# Patient Record
Sex: FEMALE | Race: OTHER | HISPANIC OR LATINO | URBAN - METROPOLITAN AREA
[De-identification: names, ages, dates, MRNs, and addresses within clinical notes are randomized per-mention and may not be internally consistent; named-entity substitution may affect disease eponyms.]

---

## 2022-12-02 ENCOUNTER — EMERGENCY (EMERGENCY)
Facility: HOSPITAL | Age: 27
LOS: 1 days | Discharge: HOME | End: 2022-12-02
Attending: EMERGENCY MEDICINE
Payer: MEDICAID

## 2022-12-02 VITALS
RESPIRATION RATE: 18 BRPM | DIASTOLIC BLOOD PRESSURE: 68 MMHG | OXYGEN SATURATION: 98 % | SYSTOLIC BLOOD PRESSURE: 120 MMHG | HEART RATE: 88 BPM | TEMPERATURE: 98 F

## 2022-12-02 VITALS
SYSTOLIC BLOOD PRESSURE: 117 MMHG | WEIGHT: 145.06 LBS | RESPIRATION RATE: 18 BRPM | DIASTOLIC BLOOD PRESSURE: 72 MMHG | OXYGEN SATURATION: 99 % | HEART RATE: 96 BPM | TEMPERATURE: 98 F | HEIGHT: 61 IN

## 2022-12-02 LAB
ALBUMIN SERPL ELPH-MCNC: 4 G/DL — SIGNIFICANT CHANGE UP (ref 3.3–5.2)
ALP SERPL-CCNC: 93 U/L — SIGNIFICANT CHANGE UP (ref 40–120)
ALT FLD-CCNC: 44 U/L — HIGH
ANION GAP SERPL CALC-SCNC: 11 MMOL/L — SIGNIFICANT CHANGE UP (ref 5–17)
APPEARANCE UR: ABNORMAL
APTT BLD: 29.8 SEC — SIGNIFICANT CHANGE UP (ref 27.5–35.5)
AST SERPL-CCNC: 25 U/L — SIGNIFICANT CHANGE UP
BACTERIA # UR AUTO: ABNORMAL
BASOPHILS # BLD AUTO: 0.03 K/UL — SIGNIFICANT CHANGE UP (ref 0–0.2)
BASOPHILS NFR BLD AUTO: 0.2 % — SIGNIFICANT CHANGE UP (ref 0–2)
BILIRUB SERPL-MCNC: <0.2 MG/DL — LOW (ref 0.4–2)
BILIRUB UR-MCNC: NEGATIVE — SIGNIFICANT CHANGE UP
BLD GP AB SCN SERPL QL: SIGNIFICANT CHANGE UP
BUN SERPL-MCNC: 8.4 MG/DL — SIGNIFICANT CHANGE UP (ref 8–20)
CALCIUM SERPL-MCNC: 9.9 MG/DL — SIGNIFICANT CHANGE UP (ref 8.4–10.5)
CHLORIDE SERPL-SCNC: 100 MMOL/L — SIGNIFICANT CHANGE UP (ref 96–108)
CO2 SERPL-SCNC: 24 MMOL/L — SIGNIFICANT CHANGE UP (ref 22–29)
COLOR SPEC: YELLOW — SIGNIFICANT CHANGE UP
CREAT SERPL-MCNC: 0.4 MG/DL — LOW (ref 0.5–1.3)
DIFF PNL FLD: ABNORMAL
EGFR: 140 ML/MIN/1.73M2 — SIGNIFICANT CHANGE UP
EOSINOPHIL # BLD AUTO: 0.14 K/UL — SIGNIFICANT CHANGE UP (ref 0–0.5)
EOSINOPHIL NFR BLD AUTO: 1.2 % — SIGNIFICANT CHANGE UP (ref 0–6)
EPI CELLS # UR: SIGNIFICANT CHANGE UP
GLUCOSE SERPL-MCNC: 77 MG/DL — SIGNIFICANT CHANGE UP (ref 70–99)
GLUCOSE UR QL: NEGATIVE MG/DL — SIGNIFICANT CHANGE UP
HCG SERPL-ACNC: HIGH MIU/ML
HCT VFR BLD CALC: 42.2 % — SIGNIFICANT CHANGE UP (ref 34.5–45)
HGB BLD-MCNC: 14 G/DL — SIGNIFICANT CHANGE UP (ref 11.5–15.5)
HIV 1 & 2 AB SERPL IA.RAPID: SIGNIFICANT CHANGE UP
IMM GRANULOCYTES NFR BLD AUTO: 0.5 % — SIGNIFICANT CHANGE UP (ref 0–0.9)
INR BLD: 1.05 RATIO — SIGNIFICANT CHANGE UP (ref 0.88–1.16)
KETONES UR-MCNC: NEGATIVE — SIGNIFICANT CHANGE UP
LEUKOCYTE ESTERASE UR-ACNC: ABNORMAL
LYMPHOCYTES # BLD AUTO: 2.81 K/UL — SIGNIFICANT CHANGE UP (ref 1–3.3)
LYMPHOCYTES # BLD AUTO: 23.4 % — SIGNIFICANT CHANGE UP (ref 13–44)
MCHC RBC-ENTMCNC: 28.6 PG — SIGNIFICANT CHANGE UP (ref 27–34)
MCHC RBC-ENTMCNC: 33.2 GM/DL — SIGNIFICANT CHANGE UP (ref 32–36)
MCV RBC AUTO: 86.1 FL — SIGNIFICANT CHANGE UP (ref 80–100)
MONOCYTES # BLD AUTO: 0.91 K/UL — HIGH (ref 0–0.9)
MONOCYTES NFR BLD AUTO: 7.6 % — SIGNIFICANT CHANGE UP (ref 2–14)
NEUTROPHILS # BLD AUTO: 8.06 K/UL — HIGH (ref 1.8–7.4)
NEUTROPHILS NFR BLD AUTO: 67.1 % — SIGNIFICANT CHANGE UP (ref 43–77)
NITRITE UR-MCNC: NEGATIVE — SIGNIFICANT CHANGE UP
PH UR: 6 — SIGNIFICANT CHANGE UP (ref 5–8)
PLATELET # BLD AUTO: 440 K/UL — HIGH (ref 150–400)
POTASSIUM SERPL-MCNC: 4.1 MMOL/L — SIGNIFICANT CHANGE UP (ref 3.5–5.3)
POTASSIUM SERPL-SCNC: 4.1 MMOL/L — SIGNIFICANT CHANGE UP (ref 3.5–5.3)
PROT SERPL-MCNC: 7.7 G/DL — SIGNIFICANT CHANGE UP (ref 6.6–8.7)
PROT UR-MCNC: NEGATIVE — SIGNIFICANT CHANGE UP
PROTHROM AB SERPL-ACNC: 12.2 SEC — SIGNIFICANT CHANGE UP (ref 10.5–13.4)
RBC # BLD: 4.9 M/UL — SIGNIFICANT CHANGE UP (ref 3.8–5.2)
RBC # FLD: 13 % — SIGNIFICANT CHANGE UP (ref 10.3–14.5)
RBC CASTS # UR COMP ASSIST: ABNORMAL /HPF (ref 0–4)
SODIUM SERPL-SCNC: 135 MMOL/L — SIGNIFICANT CHANGE UP (ref 135–145)
SP GR SPEC: 1.02 — SIGNIFICANT CHANGE UP (ref 1.01–1.02)
UROBILINOGEN FLD QL: NEGATIVE MG/DL — SIGNIFICANT CHANGE UP
WBC # BLD: 12.01 K/UL — HIGH (ref 3.8–10.5)
WBC # FLD AUTO: 12.01 K/UL — HIGH (ref 3.8–10.5)
WBC UR QL: ABNORMAL /HPF (ref 0–5)

## 2022-12-02 PROCEDURE — 85025 COMPLETE CBC W/AUTO DIFF WBC: CPT

## 2022-12-02 PROCEDURE — 80053 COMPREHEN METABOLIC PANEL: CPT

## 2022-12-02 PROCEDURE — 85610 PROTHROMBIN TIME: CPT

## 2022-12-02 PROCEDURE — 76817 TRANSVAGINAL US OBSTETRIC: CPT

## 2022-12-02 PROCEDURE — 36415 COLL VENOUS BLD VENIPUNCTURE: CPT

## 2022-12-02 PROCEDURE — 99284 EMERGENCY DEPT VISIT MOD MDM: CPT | Mod: 25

## 2022-12-02 PROCEDURE — 76801 OB US < 14 WKS SINGLE FETUS: CPT | Mod: 26

## 2022-12-02 PROCEDURE — 81001 URINALYSIS AUTO W/SCOPE: CPT

## 2022-12-02 PROCEDURE — 87086 URINE CULTURE/COLONY COUNT: CPT

## 2022-12-02 PROCEDURE — 86850 RBC ANTIBODY SCREEN: CPT

## 2022-12-02 PROCEDURE — 86900 BLOOD TYPING SEROLOGIC ABO: CPT

## 2022-12-02 PROCEDURE — 86703 HIV-1/HIV-2 1 RESULT ANTBDY: CPT

## 2022-12-02 PROCEDURE — 76801 OB US < 14 WKS SINGLE FETUS: CPT

## 2022-12-02 PROCEDURE — 99285 EMERGENCY DEPT VISIT HI MDM: CPT

## 2022-12-02 PROCEDURE — 76817 TRANSVAGINAL US OBSTETRIC: CPT | Mod: 26

## 2022-12-02 PROCEDURE — 86901 BLOOD TYPING SEROLOGIC RH(D): CPT

## 2022-12-02 PROCEDURE — 84702 CHORIONIC GONADOTROPIN TEST: CPT

## 2022-12-02 PROCEDURE — 85730 THROMBOPLASTIN TIME PARTIAL: CPT

## 2022-12-02 RX ORDER — CEPHALEXIN 500 MG
1 CAPSULE ORAL
Qty: 28 | Refills: 0
Start: 2022-12-02 | End: 2022-12-08

## 2022-12-02 RX ORDER — CEPHALEXIN 500 MG
500 CAPSULE ORAL ONCE
Refills: 0 | Status: COMPLETED | OUTPATIENT
Start: 2022-12-02 | End: 2022-12-02

## 2022-12-02 RX ADMIN — Medication 500 MILLIGRAM(S): at 16:49

## 2022-12-02 NOTE — ED PROVIDER NOTE - OBJECTIVE STATEMENT
28 y/o female with PMHx presents to ED c/o suprapubic pain yesterday with associated vaginal bleeding. LMP-September 7th. Pt states she is 11 weeks pregnant, did not have blood test or US to confirm pregnancy, only urine. Pt is having some abdominal cramping as well. No other complaints. Pt is a non-smoker and denies etoh use.

## 2022-12-02 NOTE — ED PROVIDER NOTE - NS ED ROS FT
Pt denies fevers/chills, ha, loc, focal neuro deficits, cp/sob/palp, cough, n/v/d, urinary symptoms, recent travel and sick contacts. + abdominal pain +vaginal bleeding all others negative except as per hpi

## 2022-12-02 NOTE — ED PROVIDER NOTE - PATIENT PORTAL LINK FT
You can access the FollowMyHealth Patient Portal offered by [facility] by registering at the following website: [email or web address]. By joining [employer]’s FollowMyHealth portal, you will also be able to view your health information using other applications (apps) compatible with our system.

## 2022-12-02 NOTE — ED PROVIDER NOTE - CLINICAL SUMMARY MEDICAL DECISION MAKING FREE TEXT BOX
26 y/o female with PMHx presents to ED c/o suprapubic pain yesterday with associated vaginal bleeding. Pt with no documented IUP with vaginal bleeding and cramping, Obtain US to r/o ectopic vs AB vs threatened AB.

## 2022-12-02 NOTE — ED PROVIDER NOTE - PROGRESS NOTE DETAILS
All results discussed with the patient, and a copy of results has been provided. Patient is comfortable with dc plan for home. Opportunity for questions was provided and all questions have been addressed. Patient understands when to return to ED if symptoms worsen. Gina santana

## 2022-12-04 LAB
CULTURE RESULTS: SIGNIFICANT CHANGE UP
SPECIMEN SOURCE: SIGNIFICANT CHANGE UP

## 2022-12-20 PROBLEM — Z78.9 OTHER SPECIFIED HEALTH STATUS: Chronic | Status: ACTIVE | Noted: 2022-12-11

## 2022-12-22 ENCOUNTER — ASOB RESULT (OUTPATIENT)
Age: 27
End: 2022-12-22

## 2022-12-22 ENCOUNTER — APPOINTMENT (OUTPATIENT)
Dept: ANTEPARTUM | Facility: CLINIC | Age: 27
End: 2022-12-22

## 2022-12-22 PROBLEM — Z00.00 ENCOUNTER FOR PREVENTIVE HEALTH EXAMINATION: Status: ACTIVE | Noted: 2022-12-22

## 2022-12-22 PROCEDURE — 76815 OB US LIMITED FETUS(S): CPT

## 2023-02-02 ENCOUNTER — APPOINTMENT (OUTPATIENT)
Dept: ANTEPARTUM | Facility: CLINIC | Age: 28
End: 2023-02-02
Payer: MEDICAID

## 2023-02-02 ENCOUNTER — ASOB RESULT (OUTPATIENT)
Age: 28
End: 2023-02-02

## 2023-02-02 PROCEDURE — 76817 TRANSVAGINAL US OBSTETRIC: CPT

## 2023-02-02 PROCEDURE — 76805 OB US >/= 14 WKS SNGL FETUS: CPT

## 2023-04-20 ENCOUNTER — ASOB RESULT (OUTPATIENT)
Age: 28
End: 2023-04-20

## 2023-04-20 ENCOUNTER — APPOINTMENT (OUTPATIENT)
Dept: ANTEPARTUM | Facility: CLINIC | Age: 28
End: 2023-04-20
Payer: MEDICAID

## 2023-04-20 PROCEDURE — 76816 OB US FOLLOW-UP PER FETUS: CPT

## 2023-04-20 PROCEDURE — 76819 FETAL BIOPHYS PROFIL W/O NST: CPT

## 2023-05-25 ENCOUNTER — ASOB RESULT (OUTPATIENT)
Age: 28
End: 2023-05-25

## 2023-05-25 ENCOUNTER — APPOINTMENT (OUTPATIENT)
Dept: ANTEPARTUM | Facility: CLINIC | Age: 28
End: 2023-05-25
Payer: MEDICAID

## 2023-05-25 PROCEDURE — 76819 FETAL BIOPHYS PROFIL W/O NST: CPT

## 2023-05-25 PROCEDURE — 76816 OB US FOLLOW-UP PER FETUS: CPT

## 2023-05-26 ENCOUNTER — INPATIENT (INPATIENT)
Facility: HOSPITAL | Age: 28
LOS: 0 days | Discharge: HOME | DRG: 832 | End: 2023-05-26
Attending: OBSTETRICS & GYNECOLOGY | Admitting: OBSTETRICS & GYNECOLOGY
Payer: COMMERCIAL

## 2023-05-26 VITALS
TEMPERATURE: 98 F | RESPIRATION RATE: 18 BRPM | SYSTOLIC BLOOD PRESSURE: 107 MMHG | WEIGHT: 147.05 LBS | DIASTOLIC BLOOD PRESSURE: 71 MMHG | HEIGHT: 59.06 IN | HEART RATE: 108 BPM

## 2023-05-26 VITALS — HEART RATE: 92 BPM | OXYGEN SATURATION: 92 %

## 2023-05-26 DIAGNOSIS — O47.1 FALSE LABOR AT OR AFTER 37 COMPLETED WEEKS OF GESTATION: ICD-10-CM

## 2023-05-26 DIAGNOSIS — Z98.890 OTHER SPECIFIED POSTPROCEDURAL STATES: Chronic | ICD-10-CM

## 2023-05-26 DIAGNOSIS — Z3A.37 37 WEEKS GESTATION OF PREGNANCY: ICD-10-CM

## 2023-05-26 PROCEDURE — 59412 ANTEPARTUM MANIPULATION: CPT

## 2023-05-26 RX ORDER — SODIUM CHLORIDE 9 MG/ML
1000 INJECTION, SOLUTION INTRAVENOUS ONCE
Refills: 0 | Status: COMPLETED | OUTPATIENT
Start: 2023-05-26 | End: 2023-05-26

## 2023-05-26 RX ORDER — SODIUM CHLORIDE 9 MG/ML
500 INJECTION, SOLUTION INTRAVENOUS ONCE
Refills: 0 | Status: DISCONTINUED | OUTPATIENT
Start: 2023-05-26 | End: 2023-05-26

## 2023-05-26 RX ADMIN — SODIUM CHLORIDE 1000 MILLILITER(S): 9 INJECTION, SOLUTION INTRAVENOUS at 15:00

## 2023-05-26 NOTE — OB RN PATIENT PROFILE - NSSTATEDREASONFORADM_OBGYN_A_OB_FT
The baby still hasn't settled down. So the doctors will try to reposition her My baby is in the wrong position, I am here for it to be flipped

## 2023-05-26 NOTE — OB PROVIDER H&P - HISTORY OF PRESENT ILLNESS
Patient is a 28 y.o.  at 37 weeks 2 days gestation who presents to L&D for an external cephalic version.   +FM, - LOF, - VB. She reports irregular contractions.     This pregnancy is complicated by:  - breech presentation   - BV infection s/p treatment    Gynhx: denies abnormal paps, STDs, ovarian cysts, fibroids.   PMH: parkerie  PSH: nose surgery  Meds: PNV, flagyl, ASA  SocialHx: denies alcohol, tobacco, and drug use. Feels safe at home.   All: NKDA

## 2023-05-26 NOTE — DISCHARGE NOTE ANTEPARTUM - PATIENT PORTAL LINK FT
You can access the FollowMyHealth Patient Portal offered by [facility] by registering at the following website: [email or web address]. By joining [employer]’s FollowMyHealth portal, you will also be able to view your health information using other applications (apps) compatible with our system.

## 2023-05-26 NOTE — DISCHARGE NOTE ANTEPARTUM - MEDICATION SUMMARY - MEDICATIONS TO TAKE
I will START or STAY ON the medications listed below when I get home from the hospital:    cephalexin 500 mg oral capsule  -- 1 cap(s) by mouth 4 times a day   -- Finish all this medication unless otherwise directed by prescriber.    -- Indication: For home med

## 2023-05-26 NOTE — DISCHARGE NOTE ANTEPARTUM - CARE PROVIDER_API CALL
Kaylyn Tenorio  Obstetrics and Gynecology  [facility] [place] [address]  Phone: [phone]  Fax: [phone]  Established Patient  Follow Up Time:

## 2023-05-26 NOTE — DISCHARGE NOTE ANTEPARTUM - CARE PLAN
Principal Discharge DX:	Fetus or  affected by external version before labor  Assessment and plan of treatment:	Patient was found in breech presentation and underwent a successfully external cephalic version. Patient is to present to the hospital when she starts to feel strong contractions, as well as if she feels no fetal movement, has leakage of fluid, and vaginal bleeding.   1

## 2023-05-26 NOTE — DISCHARGE NOTE ANTEPARTUM - NS MD DC FALL RISK RISK
For information on Fall & Injury Prevention, visit: [email or web address] OR  [email or web address] OR  https://www.cdc.gov/steadi/patient.html

## 2023-05-26 NOTE — OB PROVIDER H&P - ATTENDING COMMENTS
MFM Attending    Patient is a 28 year old  at 37 2/7 week admitted with a fetus in breech presentation. She was counseled for and elected to have an external cephalic version procedure. FHR was category 1 before and after procedure. See procedure note in [facility]. To be discharged home.    RAFFI Andrade

## 2023-05-26 NOTE — DISCHARGE NOTE ANTEPARTUM - HOSPITAL COURSE
Patient is a 28 y.o.  at 37 weeks 2 days gestation who presented to the hospital and underwent a successful external cephalic version, uncomplicated. Pre and post procedural NSTs reactive. Patient was sent home in stable condition.

## 2023-05-26 NOTE — OB RN PATIENT PROFILE - BRADEN SCORE (IF 18 OR LESS ACTIVATE SKIN INJURY RISK INCREASED GUIDELINE), MLM
Patient is in the supine position.   The body was positioned using the following devices: safety strap, gel pad mattress, self-adhering foam and blanket.  The left arm was positioned using the following devices: safety strap, arm board, gel arm pads, self-adhering foam and blanket.  The right arm was positioned using the following devices: safety strap, arm board, gel arm pads, self-adhering foam and blanket.  The patient was positioned by Kole Hernandez, Addis Haro, Jenna Mercado RN  23

## 2023-05-26 NOTE — OB PROVIDER H&P - NSHPPHYSICALEXAM_GEN_ALL_CORE
Vital Signs Last 24 Hrs  HR: 96 (26 May 2023 15:43) (89 - 111)  BP: 107/71 (26 May 2023 14:46) (107/71 - 107/71)  RR: 18 (26 May 2023 14:36) (18 - 18)  SpO2: 99% (26 May 2023 15:43) (91% - 100%)    Parameters below as of 26 May 2023 14:36  Patient On (Oxygen Delivery Method): room air    gen: NAD  Abd: gravid, soft, non-tender  Bedside US: head at maternal right, breech, anterior placenta  FHT: 135 bpm, moderate variability, - accels, - decels  Evarts: irregular

## 2023-05-26 NOTE — DISCHARGE NOTE ANTEPARTUM - PLAN OF CARE
Patient was found in breech presentation and underwent a successfully external cephalic version. Patient is to present to the hospital when she starts to feel strong contractions, as well as if she feels no fetal movement, has leakage of fluid, and vaginal bleeding.

## 2023-05-26 NOTE — OB PROVIDER H&P - ASSESSMENT
Patient is a 28 y.o.  at 37 weeks 2 days gestation admitted to L&D for ECV. Reactive NST.    - consent  - admission labs  - IV hydration  - continuous toco and fetal heart monitoring  - terbutaline prn  - patient blood type O+      Discussed with Dr. Andrade

## 2023-06-16 ENCOUNTER — APPOINTMENT (OUTPATIENT)
Dept: ANTEPARTUM | Facility: CLINIC | Age: 28
End: 2023-06-16
Payer: MEDICAID

## 2023-06-16 ENCOUNTER — ASOB RESULT (OUTPATIENT)
Age: 28
End: 2023-06-16

## 2023-06-16 PROCEDURE — 76818 FETAL BIOPHYS PROFILE W/NST: CPT

## 2023-06-18 ENCOUNTER — INPATIENT (INPATIENT)
Facility: HOSPITAL | Age: 28
LOS: 3 days | Discharge: HOME | End: 2023-06-22
Attending: OBSTETRICS & GYNECOLOGY | Admitting: OBSTETRICS & GYNECOLOGY
Payer: COMMERCIAL

## 2023-06-18 VITALS
HEART RATE: 95 BPM | TEMPERATURE: 98 F | RESPIRATION RATE: 17 BRPM | SYSTOLIC BLOOD PRESSURE: 130 MMHG | DIASTOLIC BLOOD PRESSURE: 81 MMHG

## 2023-06-18 DIAGNOSIS — O26.893 OTHER SPECIFIED PREGNANCY RELATED CONDITIONS, THIRD TRIMESTER: ICD-10-CM

## 2023-06-18 DIAGNOSIS — Z98.890 OTHER SPECIFIED POSTPROCEDURAL STATES: Chronic | ICD-10-CM

## 2023-06-18 LAB
BASOPHILS # BLD AUTO: 0.03 K/UL — SIGNIFICANT CHANGE UP (ref 0–0.2)
BASOPHILS NFR BLD AUTO: 0.3 % — SIGNIFICANT CHANGE UP (ref 0–2)
BLD GP AB SCN SERPL QL: SIGNIFICANT CHANGE UP
EOSINOPHIL # BLD AUTO: 0.16 K/UL — SIGNIFICANT CHANGE UP (ref 0–0.5)
EOSINOPHIL NFR BLD AUTO: 1.8 % — SIGNIFICANT CHANGE UP (ref 0–6)
HCT VFR BLD CALC: 37 % — SIGNIFICANT CHANGE UP (ref 34.5–45)
HGB BLD-MCNC: 12.2 G/DL — SIGNIFICANT CHANGE UP (ref 11.5–15.5)
IMM GRANULOCYTES NFR BLD AUTO: 0.5 % — SIGNIFICANT CHANGE UP (ref 0–0.9)
LYMPHOCYTES # BLD AUTO: 2.23 K/UL — SIGNIFICANT CHANGE UP (ref 1–3.3)
LYMPHOCYTES # BLD AUTO: 25.8 % — SIGNIFICANT CHANGE UP (ref 13–44)
MCHC RBC-ENTMCNC: 25.7 PG — LOW (ref 27–34)
MCHC RBC-ENTMCNC: 33 GM/DL — SIGNIFICANT CHANGE UP (ref 32–36)
MCV RBC AUTO: 78.1 FL — LOW (ref 80–100)
MONOCYTES # BLD AUTO: 0.75 K/UL — SIGNIFICANT CHANGE UP (ref 0–0.9)
MONOCYTES NFR BLD AUTO: 8.7 % — SIGNIFICANT CHANGE UP (ref 2–14)
NEUTROPHILS # BLD AUTO: 5.45 K/UL — SIGNIFICANT CHANGE UP (ref 1.8–7.4)
NEUTROPHILS NFR BLD AUTO: 62.9 % — SIGNIFICANT CHANGE UP (ref 43–77)
PLATELET # BLD AUTO: 368 K/UL — SIGNIFICANT CHANGE UP (ref 150–400)
RAPID RVP RESULT: DETECTED
RBC # BLD: 4.74 M/UL — SIGNIFICANT CHANGE UP (ref 3.8–5.2)
RBC # FLD: 14.3 % — SIGNIFICANT CHANGE UP (ref 10.3–14.5)
RV+EV RNA SPEC QL NAA+PROBE: DETECTED
SARS-COV-2 RNA SPEC QL NAA+PROBE: SIGNIFICANT CHANGE UP
WBC # BLD: 8.66 K/UL — SIGNIFICANT CHANGE UP (ref 3.8–10.5)
WBC # FLD AUTO: 8.66 K/UL — SIGNIFICANT CHANGE UP (ref 3.8–10.5)

## 2023-06-18 RX ORDER — SODIUM CHLORIDE 9 MG/ML
1000 INJECTION, SOLUTION INTRAVENOUS
Refills: 0 | Status: DISCONTINUED | OUTPATIENT
Start: 2023-06-18 | End: 2023-06-20

## 2023-06-18 RX ORDER — CHLORHEXIDINE GLUCONATE 213 G/1000ML
1 SOLUTION TOPICAL DAILY
Refills: 0 | Status: DISCONTINUED | OUTPATIENT
Start: 2023-06-18 | End: 2023-06-20

## 2023-06-18 RX ORDER — CITRIC ACID/SODIUM CITRATE 300-500 MG
30 SOLUTION, ORAL ORAL ONCE
Refills: 0 | Status: DISCONTINUED | OUTPATIENT
Start: 2023-06-18 | End: 2023-06-20

## 2023-06-18 RX ORDER — OXYTOCIN 10 UNIT/ML
2 VIAL (ML) INJECTION
Qty: 30 | Refills: 0 | Status: DISCONTINUED | OUTPATIENT
Start: 2023-06-18 | End: 2023-06-22

## 2023-06-18 RX ORDER — OXYTOCIN 10 UNIT/ML
333.33 VIAL (ML) INJECTION
Qty: 20 | Refills: 0 | Status: COMPLETED | OUTPATIENT
Start: 2023-06-18 | End: 2023-06-18

## 2023-06-18 RX ORDER — ACETAMINOPHEN 500 MG
975 TABLET ORAL ONCE
Refills: 0 | Status: COMPLETED | OUTPATIENT
Start: 2023-06-18 | End: 2023-06-18

## 2023-06-18 RX ADMIN — SODIUM CHLORIDE 125 MILLILITER(S): 9 INJECTION, SOLUTION INTRAVENOUS at 08:45

## 2023-06-18 RX ADMIN — Medication 975 MILLIGRAM(S): at 17:46

## 2023-06-18 RX ADMIN — Medication 975 MILLIGRAM(S): at 18:30

## 2023-06-18 NOTE — OB RN DELIVERY SUMMARY - NS_SEPSISRSKCALC_OBGYN_ALL_OB_FT
EOS calculated successfully. EOS Risk Factor: 0.5/1000 live births ([facility] national incidence); GA=40w6d; Temp=98.96; ROM=24.667; GBS='Negative'; Antibiotics='No antibiotics or any antibiotics < 2 hrs prior to birth'

## 2023-06-18 NOTE — OB PROVIDER H&P - NSHPPHYSICALEXAM_GEN_ALL_CORE
Vitals:   T(C): 36.7 (06-18-23 @ 09:03), Max: 36.7 (06-18-23 @ 08:43)  T(F): 98.1 (06-18-23 @ 09:03), Max: 98.1 (06-18-23 @ 09:03)  HR: 95 (06-18-23 @ 09:03) (95 - 95)  BP: 130/81 (06-18-23 @ 09:03) (130/81 - 130/81)  RR: 17 (06-18-23 @ 09:03) (17 - 17)    General: AAOx3, NAD  Abd: Soft, nontender, gravid  SVE: 0/0/-3    BMI (kg/m2): 29.3 (06-18-23), 29.6 (05-26-23)    FHT: reactive  [place]:  irregular    Ultrasound: (6/16 MF sono pending) breech anterior on 5/26 prior to ECV   EFW: 3285g, 72%ile on 5/26

## 2023-06-18 NOTE — OB RN DELIVERY SUMMARY - NSSELHIDDEN_OBGYN_ALL_OB_FT
[NS_DeliveryAttending1_OBGYN_ALL_OB_FT:[email or web address]],[NS_DeliveryAssist1_OBGYN_ALL_OB_FT:[id number]],[NS_DeliveryRN_OBGYN_ALL_OB_FT:[id number]=]

## 2023-06-18 NOTE — OB RN DELIVERY SUMMARY - NS_LABORCHARACTER_OBGYN_ALL_OB
Induction of labor-Medicinal/External electronic FM Induction of labor-Medicinal/Augmentation of labor/External electronic FM

## 2023-06-18 NOTE — OB RN DELIVERY SUMMARY - NS_BIRTHTRAUMADETAILSA_OBGYN_ALL_OB_FT
Upon  assessment by SAJI Cobos, Neonatologist MD Terrell called to bedside to assess newborns left arm. X-ray ordered. Pt's family educated on upcoming x-ray and plan of care.

## 2023-06-18 NOTE — OB PROVIDER H&P - NSLOWPPHRISK_OBGYN_A_OB
No previous uterine incision/Cook Pregnancy/Less than or equal to 4 previous vaginal births/No known bleeding disorder/No history of postpartum hemorrhage/No other PPH risks indicated

## 2023-06-18 NOTE — OB PROVIDER H&P - ASSESSMENT
A/P: SONALI FISHER is a 29yo  at 40w4d by LMP 22 ZHEN 23 who presents for term IOL.     Admission labs  Consent  GBS neg, no ppx  Cephalic, intact, 0/0/-3  Johnson irregularly  Plan for buccal cytotec   Analgesia PRN    D/w Dr. Rice

## 2023-06-18 NOTE — OB RN PATIENT PROFILE - NSNAMEOFMDOFFICE_OBGYN_ALL_OB_FT
Additional Notes: Left Mid Upper Back, Excision\\nWound/Scar of Prior Procedure, No Residual Squamous Cell Carcinoma is Identified\\nR22.9 Detail Level: Detailed SR[facility]

## 2023-06-18 NOTE — OB PROVIDER H&P - HISTORY OF PRESENT ILLNESS
SONALI FISHER is a 29yo G at  (dated by c/w x trimester sono) presenting for     ROS:  Denies leakage of fluids, vaginal bleeding, painful contractions. Reports active fetal movement. ***  Denies fever, chills, nausea, vomiting. ***  Denies HA, RUQ pain, vision changes, increased leg swelling. ***    PNC @    OBhx: denies  Gyn: denies  PMH: denies  PSH: denies  Med: PNV  Allergies: NKDA    Vitals: ***  T(C): 36.7 (06-18-23 @ 09:03), Max: 36.7 (06-18-23 @ 08:43)  T(F): 98.1 (06-18-23 @ 09:03), Max: 98.1 (06-18-23 @ 09:03)  HR: 95 (06-18-23 @ 09:03) (95 - 95)  BP: 130/81 (06-18-23 @ 09:03) (130/81 - 130/81)  RR: 17 (06-18-23 @ 09:03) (17 - 17)  SpO2: --    General: AAOx3, NAD  Heart: RRR  Lungs: CTAB  Abd: Soft, nontender, gravid  SVE: ***    BMI: ***  BMI (kg/m2): 29.3 (06-18-23), 29.6 (05-26-23)      FHT: ***  Reservoir:  ***    MFM sono:   Bedside sono: ***      A/P:     - Admit to L&D for eIOL:        Admission labs        Consent        Induction method: ***  - Admit to L&D for CS:        Pre op labs        Consent        Antibiotics  - Labs:         GBS         Rub I/I        HIV        Syphilis        Blood type        Hb  - Maternal and fetal status reassuring, will continue to monitor ***        VSS        Cephalic presentation        Cat 1 tracing        Not jaz  - Analgesia: ***    D/w ***     SONALI FISHER is a 27yo  at 40w4d by LMP 22 ZHEN 23 who presents for term IOL. Reports irregular contractions and active FM. Denies VB and LOF.    PNC @ [facility]:  - Breech presentation s/p successful ECV      OBhx: denies  Gyn: denies  PMH: denies  PSH: rhinoplasty  Med: PNV, ASA  Allergies: NKDA

## 2023-06-19 LAB — T PALLIDUM AB TITR SER: NEGATIVE — SIGNIFICANT CHANGE UP

## 2023-06-19 RX ADMIN — CHLORHEXIDINE GLUCONATE 1 APPLICATION(S): 213 SOLUTION TOPICAL at 16:18

## 2023-06-19 RX ADMIN — Medication 2 MILLIUNIT(S)/MIN: at 08:37

## 2023-06-19 RX ADMIN — Medication 2 MILLIUNIT(S)/MIN: at 00:22

## 2023-06-19 RX ADMIN — SODIUM CHLORIDE 125 MILLILITER(S): 9 INJECTION, SOLUTION INTRAVENOUS at 08:38

## 2023-06-19 RX ADMIN — SODIUM CHLORIDE 125 MILLILITER(S): 9 INJECTION, SOLUTION INTRAVENOUS at 16:18

## 2023-06-20 ENCOUNTER — APPOINTMENT (OUTPATIENT)
Dept: ANTEPARTUM | Facility: CLINIC | Age: 28
End: 2023-06-20

## 2023-06-20 ENCOUNTER — TRANSCRIPTION ENCOUNTER (OUTPATIENT)
Age: 28
End: 2023-06-20

## 2023-06-20 LAB
HCT VFR BLD CALC: 30.7 % — LOW (ref 34.5–45)
HGB BLD-MCNC: 9.7 G/DL — LOW (ref 11.5–15.5)

## 2023-06-20 RX ORDER — ACETAMINOPHEN 500 MG
975 TABLET ORAL
Refills: 0 | Status: DISCONTINUED | OUTPATIENT
Start: 2023-06-20 | End: 2023-06-22

## 2023-06-20 RX ORDER — IBUPROFEN 200 MG
600 TABLET ORAL EVERY 6 HOURS
Refills: 0 | Status: COMPLETED | OUTPATIENT
Start: 2023-06-20 | End: 2024-05-18

## 2023-06-20 RX ORDER — TETANUS TOXOID, REDUCED DIPHTHERIA TOXOID AND ACELLULAR PERTUSSIS VACCINE, ADSORBED 5; 2.5; 8; 8; 2.5 [IU]/.5ML; [IU]/.5ML; UG/.5ML; UG/.5ML; UG/.5ML
0.5 SUSPENSION INTRAMUSCULAR ONCE
Refills: 0 | Status: DISCONTINUED | OUTPATIENT
Start: 2023-06-20 | End: 2023-06-22

## 2023-06-20 RX ORDER — OXYTOCIN 10 UNIT/ML
41.67 VIAL (ML) INJECTION
Qty: 20 | Refills: 0 | Status: DISCONTINUED | OUTPATIENT
Start: 2023-06-20 | End: 2023-06-22

## 2023-06-20 RX ORDER — PRAMOXINE HYDROCHLORIDE 150 MG/15G
1 AEROSOL, FOAM RECTAL EVERY 4 HOURS
Refills: 0 | Status: DISCONTINUED | OUTPATIENT
Start: 2023-06-20 | End: 2023-06-22

## 2023-06-20 RX ORDER — SIMETHICONE 80 MG/1
80 TABLET, CHEWABLE ORAL EVERY 4 HOURS
Refills: 0 | Status: DISCONTINUED | OUTPATIENT
Start: 2023-06-20 | End: 2023-06-22

## 2023-06-20 RX ORDER — KETOROLAC TROMETHAMINE 30 MG/ML
30 SYRINGE (ML) INJECTION ONCE
Refills: 0 | Status: DISCONTINUED | OUTPATIENT
Start: 2023-06-20 | End: 2023-06-20

## 2023-06-20 RX ORDER — OXYCODONE HYDROCHLORIDE 5 MG/1
5 TABLET ORAL
Refills: 0 | Status: DISCONTINUED | OUTPATIENT
Start: 2023-06-20 | End: 2023-06-22

## 2023-06-20 RX ORDER — TRANEXAMIC ACID 100 MG/ML
1000 INJECTION, SOLUTION INTRAVENOUS ONCE
Refills: 0 | Status: COMPLETED | OUTPATIENT
Start: 2023-06-20 | End: 2023-06-20

## 2023-06-20 RX ORDER — AER TRAVELER 0.5 G/1
1 SOLUTION RECTAL; TOPICAL EVERY 4 HOURS
Refills: 0 | Status: DISCONTINUED | OUTPATIENT
Start: 2023-06-20 | End: 2023-06-22

## 2023-06-20 RX ORDER — SODIUM CHLORIDE 9 MG/ML
3 INJECTION INTRAMUSCULAR; INTRAVENOUS; SUBCUTANEOUS EVERY 8 HOURS
Refills: 0 | Status: DISCONTINUED | OUTPATIENT
Start: 2023-06-20 | End: 2023-06-22

## 2023-06-20 RX ORDER — HYDROCORTISONE 1 %
1 OINTMENT (GRAM) TOPICAL EVERY 6 HOURS
Refills: 0 | Status: DISCONTINUED | OUTPATIENT
Start: 2023-06-20 | End: 2023-06-22

## 2023-06-20 RX ORDER — IBUPROFEN 200 MG
600 TABLET ORAL EVERY 6 HOURS
Refills: 0 | Status: DISCONTINUED | OUTPATIENT
Start: 2023-06-20 | End: 2023-06-22

## 2023-06-20 RX ORDER — MAGNESIUM HYDROXIDE 400 MG/1
30 TABLET, CHEWABLE ORAL
Refills: 0 | Status: DISCONTINUED | OUTPATIENT
Start: 2023-06-20 | End: 2023-06-22

## 2023-06-20 RX ORDER — DIBUCAINE 1 %
1 OINTMENT (GRAM) RECTAL EVERY 6 HOURS
Refills: 0 | Status: DISCONTINUED | OUTPATIENT
Start: 2023-06-20 | End: 2023-06-22

## 2023-06-20 RX ORDER — DIPHENHYDRAMINE HCL 50 MG
25 CAPSULE ORAL EVERY 6 HOURS
Refills: 0 | Status: DISCONTINUED | OUTPATIENT
Start: 2023-06-20 | End: 2023-06-22

## 2023-06-20 RX ORDER — OXYCODONE HYDROCHLORIDE 5 MG/1
5 TABLET ORAL ONCE
Refills: 0 | Status: DISCONTINUED | OUTPATIENT
Start: 2023-06-20 | End: 2023-06-22

## 2023-06-20 RX ORDER — LANOLIN
1 OINTMENT (GRAM) TOPICAL EVERY 6 HOURS
Refills: 0 | Status: DISCONTINUED | OUTPATIENT
Start: 2023-06-20 | End: 2023-06-22

## 2023-06-20 RX ORDER — BENZOCAINE 10 %
1 GEL (GRAM) MUCOUS MEMBRANE EVERY 6 HOURS
Refills: 0 | Status: DISCONTINUED | OUTPATIENT
Start: 2023-06-20 | End: 2023-06-22

## 2023-06-20 RX ADMIN — SODIUM CHLORIDE 3 MILLILITER(S): 9 INJECTION INTRAMUSCULAR; INTRAVENOUS; SUBCUTANEOUS at 07:04

## 2023-06-20 RX ADMIN — TRANEXAMIC ACID 220 MILLIGRAM(S): 100 INJECTION, SOLUTION INTRAVENOUS at 02:58

## 2023-06-20 RX ADMIN — SODIUM CHLORIDE 3 MILLILITER(S): 9 INJECTION INTRAMUSCULAR; INTRAVENOUS; SUBCUTANEOUS at 23:28

## 2023-06-20 RX ADMIN — Medication 600 MILLIGRAM(S): at 18:15

## 2023-06-20 RX ADMIN — Medication 975 MILLIGRAM(S): at 21:29

## 2023-06-20 RX ADMIN — Medication 600 MILLIGRAM(S): at 23:14

## 2023-06-20 RX ADMIN — Medication 1000 MILLIUNIT(S)/MIN: at 03:21

## 2023-06-20 RX ADMIN — Medication 30 MILLIGRAM(S): at 04:21

## 2023-06-20 RX ADMIN — Medication 30 MILLIGRAM(S): at 03:53

## 2023-06-20 RX ADMIN — Medication 975 MILLIGRAM(S): at 16:34

## 2023-06-20 RX ADMIN — Medication 600 MILLIGRAM(S): at 12:55

## 2023-06-20 RX ADMIN — Medication 975 MILLIGRAM(S): at 08:36

## 2023-06-20 RX ADMIN — Medication 1 TABLET(S): at 12:56

## 2023-06-20 NOTE — DISCHARGE NOTE OB - MEDICATION SUMMARY - MEDICATIONS TO TAKE
I will START or STAY ON the medications listed below when I get home from the hospital:    ibuprofen 600 mg oral tablet  -- 1 tab(s) by mouth every 6 hours  -- Indication: For Pain    acetaminophen 500 mg oral tablet  -- 2 tab(s) by mouth every 6 hours  -- Indication: For Pain    Prenatal Multivitamins with Folic Acid 1 mg oral tablet  -- 1 tab(s) by mouth once a day  -- Indication: For Healthy mom and baby

## 2023-06-20 NOTE — OB PROVIDER DELIVERY SUMMARY - NSPROVIDERDELIVERYNOTE_OBGYN_ALL_OB_FT
at 3:20 AM of a live female, 3620 gm, [id number], and Apgars 8/9. Delivered OA, no nuchal cord, clear fluid. Infant's head delivered with maternal expulsive efforts. Shoulders delivered without difficulty followed by the rest of the body. Left compound arm noted.  Baby handed to patient. Nose and mouth were bulb suctioned. Cord clamped and cut after delay. Samples obtained. Placenta delivered spontaneously, intact, 3VC. Fundus firm, minimal bleeding. Perineum and vagina inspected. 2nd degree perineal laceration repaired under local anesthesia with vicryl suture. EBL 99cc. Hemostasis noted. Pt tolerated procedure well, in stable condition, recovering in LDR. Infant in LDR. Instrument/sponge count correct x 2 and confirmed by nurse.  at 3:20 AM of a live female, 3620 gm, [id number], and Apgars 8/9. Delivered OA, no nuchal cord, clear fluid. Infant's head delivered with maternal expulsive efforts. Shoulders delivered without difficulty followed by the rest of the body. Left compound arm noted.  Baby handed to patient. Nose and mouth were bulb suctioned. Cord clamped and cut after delay. Samples obtained. Placenta delivered spontaneously, intact, 3VC. Fundus firm, minimal bleeding. Perineum and vagina inspected. 2nd degree perineal laceration repaired under local anesthesia with vicryl suture. EBL 99cc. Hemostasis noted. Pt tolerated procedure well, in stable condition, recovering in LDR. Infant in LDR. Instrument/sponge count correct x 2 and confirmed by nurse.    I was present throughout entire procedure  ppalos

## 2023-06-20 NOTE — OB PROVIDER LABOR PROGRESS NOTE - ASSESSMENT
Cat I tracing  Continue with pitocin   Continue to monitor 
-VSS  -Currently pushing 
Category I FHT  S/p buccal cyto x2  Will reexamine 3h after third dose
Plan  -Cat 1 tracing  -Switch to Pitocin, jaz too frequently for cytotec
-VSS  -Patient placed in high fowlers  -Continue with Pitocin, currently at 18  -Continue to monitor. Will reassess in an hours

## 2023-06-20 NOTE — OB PROVIDER LABOR PROGRESS NOTE - NS_OBIHIFHRDETAILS_OBGYN_ALL_OB_FT
130bpm mod variability +accles-decels
baseline 140, moderate variability, +accels, -decels
baseline FHR 140s, moderate variability, +accels -decels
125/mod yaquelin/+accels/-decels
baseline FHR 145s, moderate variability, +accels, -decels

## 2023-06-20 NOTE — DISCHARGE NOTE OB - CARE PLAN
Principal Discharge DX:	 (normal spontaneous vaginal delivery)  Assessment and plan of treatment:	Patient underwent a normal spontaneous vaginal delivery. Post-partum course was uncomplicated. Pain is well controlled with PRN medication. She has no difficulty with ambulation, voiding, or PO intake. Lab values and vital signs are within normal limits prior to discharge.  1) Please take acetaminophen and ibuprofen as needed for pain as prescribed.  2) Nothing in the vagina for 6 weeks (including no sex, no tampons, and no douching).  3) Please call your doctor for a follow up postpartum appointment in 4-6 weeks.  4) Please continue taking vitamins postpartum. Take iron and colace for acute blood loss anemia.  5) Please call the office sooner if you have heavy vaginal bleeding, severe abdominal pain, or fever > 100.4F.  6) You may resume regular daily activity as tolerated   1

## 2023-06-20 NOTE — OB PROVIDER DELIVERY SUMMARY - NSOBVTERISKASSESS_OBGYN_ALL_OB_FT
Subjective   Chief complaint fall.  This is a 89 who had an unwitnessed fall today.  The patient states that she got up and excellently tripped over something in the floor and fell.  She states she has had pain about her right hip as well as her neck since that time.  She denies any loss of conscious nausea barbers visual changes.  The patient does have some mild dementia.  She denies any chest pain abdominal pain or any other areas of pain or injury.  She rates her pain as sharp stabbing 4/10 nonradiating pain with no other alleviating or exacerbating factors.        History provided by:  Patient      Review of Systems   Constitutional: Negative for chills and fever.   HENT: Negative for congestion and sore throat.    Eyes: Negative for redness.   Respiratory: Negative for shortness of breath.    Cardiovascular: Negative for chest pain.   Gastrointestinal: Negative for abdominal pain.   Endocrine: Negative for cold intolerance and heat intolerance.   Genitourinary: Negative for difficulty urinating and dysuria.   Musculoskeletal: Negative for back pain.        See HPI   Skin: Negative for rash.   Allergic/Immunologic: Negative for immunocompromised state.   Neurological: Negative for dizziness and weakness.   Hematological: Negative for adenopathy.   Psychiatric/Behavioral: Negative for confusion.   All other systems reviewed and are negative.      Past Medical History:   Diagnosis Date   • Chronic back pain    • Dementia (CMS/HCC)    • Diabetes mellitus (CMS/HCC)    • Hypertension    • Subarachnoid hemorrhage (CMS/HCC)    • Subdural hematoma (CMS/HCC)    • TBI (traumatic brain injury) (CMS/HCC)    • Vertebral fracture, osteoporotic (CMS/HCC)        Allergies   Allergen Reactions   • Denosumab Itching       History reviewed. No pertinent surgical history.    History reviewed. No pertinent family history.    Social History     Socioeconomic History   • Marital status:      Spouse name: Not on file   • Number  of children: Not on file   • Years of education: Not on file   • Highest education level: Not on file           Objective   Physical Exam   Constitutional: She appears well-developed and well-nourished. No distress.   HENT:   Head: Normocephalic and atraumatic.   Right Ear: External ear normal.   Left Ear: External ear normal.   Nose: Nose normal.   Eyes: Conjunctivae and EOM are normal. Pupils are equal, round, and reactive to light.   Neck: Normal range of motion. Neck supple. No JVD present.   Cardiovascular: Normal rate, regular rhythm, normal heart sounds and intact distal pulses.   Pulmonary/Chest: Effort normal and breath sounds normal. No stridor. She has no wheezes. She has no rales.   Abdominal: Soft. Bowel sounds are normal. She exhibits no mass. There is no tenderness. There is no rebound and no guarding. No hernia.   Musculoskeletal: Normal range of motion.   Full range of motion about bilateral shoulders elbows wrists hips knees and ankles without any difficulty or discomfort.  Neurovascularly intact in all 4 extremities.   Lymphadenopathy:     She has no cervical adenopathy.   Neurological: She is alert. No cranial nerve deficit. GCS eye subscore is 4. GCS verbal subscore is 5. GCS motor subscore is 6.   Strength is 5/5, equal.  Oriented to person only disoriented to place and time but does know this is a hospital.   Skin: Skin is warm and dry. Capillary refill takes less than 2 seconds. No rash noted.   Psychiatric: She has a normal mood and affect.   Nursing note and vitals reviewed.      Procedures           ED Course  ED Course as of Nov 23 1140   Sat Nov 23, 2019   1101 I discussed the findings with the patient as well as follow care instructions the patient expressed understanding  [WP]   1137 She was able to ambulate without difficulty using her baseline ambulation status/with a walker  [WP]      ED Course User Index  [WP] Franklin Carvalho MD      CT Cervical Spine Without Contrast    Final Result   No acute disease in the cervical spine.   2. Moderate old appearing compression fractures at multiple levels in   the superior thoracic spine at the T3 and T4-T5 levels causing focal   increase kyphotic curvature in the superior thoracic spine and causing   mild-moderate spinal canal stenosis at the T5 level.       Electronically Signed By-DR. Kal Feldman MD On:11/23/2019 10:40   AM   This report was finalized on [id number] by DR. Kal Feldman MD.      CT Head Without Contrast   Final Result       1. No acute intracranial MRI seen.   2. No evidence of fracture the skull or facial bones.       Electronically Signed By-DR. Kal Feldman MD On:11/23/2019 10:33   AM   This report was finalized on [id number] by DR. Kal Feldman MD.      XR Hip With or Without Pelvis 2 - 3 View Right   Final Result   No acute disease in the right hip joint or pelvis and no significant   change since previous study performed on 05/05/2016       Electronically Signed By-DR. Kal Feldman MD On:11/23/2019 9:23   AM   This report was finalized on [id number] by DR. Kal Feldman MD.        Lab Results (last 72 hours)     Procedure Component Value Units Date/Time    POC Glucose Once [[id number]]  (Abnormal) Collected:  11/23/19 0944    Specimen:  Blood Updated:  11/23/19 0947     Glucose 161 mg/dL      Comment: Serial Number: [id number]:  [id number]           Medications - No data to display      I discussed results with patient and family who expressed understanding.  The patient was ambulated with walker assistance which is her baseline without any difficulty.  I discussed follow-up care instructions with the patient and family as well as head injury precautions and they expressed understanding.        MDM  Differential diagnosis; this does not constitute the entirety of considered causes:      Intracranial hemorrhage, intracranial tumor, hypertensive  crisis, stroke, TIA    Fracture including C-spine skull hip pelvis etc. neurovascular injury    Final diagnoses:   Fall, initial encounter   Closed head injury without loss of consciousness, initial encounter   Right hip pain   Contusion of right hip, initial encounter   Neck sprain, initial encounter              Franklin Carvalho MD  11/23/19 2555     OBPostPartum Assessment Completed on: 20-Jun-2023 04:08

## 2023-06-20 NOTE — OB PROVIDER DELIVERY SUMMARY - NSSELHIDDEN_OBGYN_ALL_OB_FT
[NS_DeliveryAttending1_OBGYN_ALL_OB_FT:[id number]],[NS_DeliveryAssist1_OBGYN_ALL_OB_FT:[id number]],[NS_DeliveryRN_OBGYN_ALL_OB_FT:[id number]=]

## 2023-06-20 NOTE — DISCHARGE NOTE OB - CARE PROVIDER_API CALL
Samantha Rice  Obstetrics and Gynecology  [address], [address]  [place] [address]  Phone: [phone]  Fax: [phone]  Follow Up Time: 1 month

## 2023-06-20 NOTE — DISCHARGE NOTE OB - PATIENT PORTAL LINK FT
You can access the FollowMyHealth Patient Portal offered by [facility] by registering at the following website: [email or web address]. By joining [employer]’s FollowMyHealth portal, you will also be able to view your health information using other applications (apps) compatible with our system.

## 2023-06-20 NOTE — DISCHARGE NOTE OB - NS MD DC FALL RISK RISK
For information on Fall & Injury Prevention, visit: [email or web address] OR  [email or web address] OR  https://www.cdc.gov/steadi/patient.html

## 2023-06-20 NOTE — OB NEONATOLOGY/PEDIATRICIAN DELIVERY SUMMARY - NSPEDSNEONOTESA_OBGYN_ALL_OB_FT
Reached bedside when baby was 10 minutes old, shoulder dystocia was present. on examiantion the left hand is by the side of the body and right hand is flexed. Palmar grasp is present, baby trying to move hand with slight flexion at forearm  and able to move against gravity but not against gravity. The length of whole left clavicle palpated no crepitus felt. Parents were explained on the findings and chances of recovery as spontaneous movements of left arm were improving. They were advised to keep hand in sling and not move the arm above the shoulder. Sign out was given to Peds hospitalist. Chest X-ray during day and PT / Peds neuro consult after discharge.  Called 2 hours later to assess for left leg as baby nurse felt the left leg had swollen. on examination both legs normal, equal movements, plantar grasp normal. Pulses well felt. Communicated with the baby nurse.

## 2023-06-20 NOTE — OB PROVIDER LABOR PROGRESS NOTE - NS_SUBJECTIVE/OBJECTIVE_OBGYN_ALL_OB_FT
Patient reports rectal pressure.
patient feels well with epidural
Patient re-examined at bedside. Reports pelvic pressure.
Patient was reexamined for feeling vaginal pressure

## 2023-06-21 RX ORDER — ETONOGESTREL 68 MG/1
68 IMPLANT SUBCUTANEOUS ONCE
Refills: 0 | Status: COMPLETED | OUTPATIENT
Start: 2023-06-21 | End: 2023-06-21

## 2023-06-21 RX ORDER — LIDOCAINE HCL 20 MG/ML
5 VIAL (ML) INJECTION ONCE
Refills: 0 | Status: COMPLETED | OUTPATIENT
Start: 2023-06-21 | End: 2023-06-21

## 2023-06-21 RX ADMIN — ETONOGESTREL 68 MILLIGRAM(S): 68 IMPLANT SUBCUTANEOUS at 13:40

## 2023-06-21 RX ADMIN — Medication 975 MILLIGRAM(S): at 21:01

## 2023-06-21 RX ADMIN — Medication 600 MILLIGRAM(S): at 12:23

## 2023-06-21 RX ADMIN — Medication 975 MILLIGRAM(S): at 15:13

## 2023-06-21 RX ADMIN — Medication 5 MILLILITER(S): at 13:41

## 2023-06-21 RX ADMIN — Medication 1 TABLET(S): at 12:24

## 2023-06-21 RX ADMIN — Medication 975 MILLIGRAM(S): at 02:12

## 2023-06-21 RX ADMIN — Medication 600 MILLIGRAM(S): at 05:04

## 2023-06-21 RX ADMIN — SODIUM CHLORIDE 3 MILLILITER(S): 9 INJECTION INTRAMUSCULAR; INTRAVENOUS; SUBCUTANEOUS at 04:39

## 2023-06-21 RX ADMIN — Medication 975 MILLIGRAM(S): at 08:32

## 2023-06-21 RX ADMIN — Medication 600 MILLIGRAM(S): at 17:21

## 2023-06-21 NOTE — CHART NOTE - NSCHARTNOTEFT_GEN_A_CORE
[facility] Note:    Initial Screening    Date of Initial Screenin23    Patient qualifies for [facility] Program  [ x ] Patient qualifies for [facility] Program w/ health condition  [  ] Patient does not qualify for [facility] Program w/ no health conditions    Diagnosis that qualifies patient for program  [  ] Hypertension  [  ] Diabetes  [  ] Unintended weight loss  [  ] Obesity  [  ] CHF  [ x ] Other    Additional Comments: postpartum mother          Current Patient Diet:Diet, Regular (23 @ 03:42)        Actions Taken:  [ x ] Spoke with patient  [ x ] Schoo survey completed  Additional Comments: Provided patient with community resources through [employer], as well as [place]. Discussed nutrition education for breastfeeding. Patient groceries given at D/C.          Non-qualification for [facility] Program  [   ] D/C to JUDIE  [   ] Referred to Social Work  [  ] Declined [facility] Program  [   ] D/C Before Seen By RD  Participant Phone Number:  DELONTE Comments:              Discharge Visit  [  ] Initial Screening already completed    Date of D/C:  [ x ]  Patient provided with healthy groceries  [ x ] Follow Up appointment made  [ x ] Other community outreach given  [  ] Help with SNAP application at F/U appointment  [  ] Patient D/C while RD was unavailable. Will call to schedule pick-up
Obtained written consent from patient for Nexplanon insertion. Patient informed of risks, benefits, alternatives, side effects. All questions answered.  Patient prepped with iodine, 5cc 2% lidocaine, and Nexplanon inserted into left arm in the usual fashion.  Patient palpated Nexplanon in arm.  Provided patient with reminder card for removal/replacement in three years.  Lot # [id number]

## 2023-06-21 NOTE — PROGRESS NOTE ADULT - ATTENDING COMMENTS
PPD#1  Doing well  meeting milestones  Hgb 12.2 --> 9.7- no complains of anemia  desires nexplanon for BC  f/u in the office in 4-6 weeks for postpartum check  routine PP care  ppalos

## 2023-06-21 NOTE — PROGRESS NOTE ADULT - ASSESSMENT
SONALI FISHER is a 28y  now PPD#1 s/p spontaneous vaginal delivery at 40w5d, complicated by partial Erbs palsy.    -Vital signs stable  -Hgb: 9.7 -> AM labs pending   -Voiding, tolerating PO  -Advance care as tolerated   -Continue routine postpartum care and education  -Desires Nexplanon  -Healthy female infant  -Dispo: Continue inpatient management

## 2023-06-22 VITALS
DIASTOLIC BLOOD PRESSURE: 74 MMHG | SYSTOLIC BLOOD PRESSURE: 115 MMHG | RESPIRATION RATE: 18 BRPM | TEMPERATURE: 99 F | OXYGEN SATURATION: 96 % | HEART RATE: 99 BPM

## 2023-06-22 PROCEDURE — 36415 COLL VENOUS BLD VENIPUNCTURE: CPT

## 2023-06-22 PROCEDURE — 93971 EXTREMITY STUDY: CPT | Mod: 26,LT

## 2023-06-22 PROCEDURE — 86780 TREPONEMA PALLIDUM: CPT

## 2023-06-22 PROCEDURE — 85014 HEMATOCRIT: CPT

## 2023-06-22 PROCEDURE — 93971 EXTREMITY STUDY: CPT

## 2023-06-22 PROCEDURE — 0225U NFCT DS DNA&RNA 21 SARSCOV2: CPT

## 2023-06-22 PROCEDURE — 86900 BLOOD TYPING SEROLOGIC ABO: CPT

## 2023-06-22 PROCEDURE — 86850 RBC ANTIBODY SCREEN: CPT

## 2023-06-22 PROCEDURE — 85025 COMPLETE CBC W/AUTO DIFF WBC: CPT

## 2023-06-22 PROCEDURE — 86901 BLOOD TYPING SEROLOGIC RH(D): CPT

## 2023-06-22 PROCEDURE — T1013: CPT

## 2023-06-22 PROCEDURE — 59050 FETAL MONITOR W/REPORT: CPT

## 2023-06-22 PROCEDURE — 85018 HEMOGLOBIN: CPT

## 2023-06-22 RX ORDER — IBUPROFEN 200 MG
1 TABLET ORAL
Qty: 20 | Refills: 0
Start: 2023-06-22 | End: 2023-06-26

## 2023-06-22 RX ORDER — ACETAMINOPHEN 500 MG
2 TABLET ORAL
Qty: 40 | Refills: 0
Start: 2023-06-22 | End: 2023-06-26

## 2023-06-22 RX ADMIN — Medication 975 MILLIGRAM(S): at 08:24

## 2023-06-22 RX ADMIN — Medication 600 MILLIGRAM(S): at 05:57

## 2023-06-22 RX ADMIN — Medication 975 MILLIGRAM(S): at 02:22

## 2023-06-22 NOTE — PROGRESS NOTE ADULT - SUBJECTIVE AND OBJECTIVE BOX
28y Female s/p labor epidural  on 06/19/2023    Vital Signs Last    T(C): 36.8 (21 Jun 2023 04:43), Max: 36.9 (20 Jun 2023 12:00)  T(F): 98.2 (21 Jun 2023 04:43), Max: 98.4 (20 Jun 2023 12:00)  HR: 98 (21 Jun 2023 04:43) (87 - 98)  BP: 100/65 (21 Jun 2023 04:43) (100/62 - 103/68)  BP(mean): --  RR: 18 (21 Jun 2023 04:43) (16 - 18)  SpO2: 97% (21 Jun 2023 04:43) (96% - 98%)    Parameters below as of 21 Jun 2023 04:43    Patient On (Oxygen Delivery Method): room air            Patient's overall anesthesia satisfaction: Positive    Patient seen and doing well     No headache      No residual numbness or weakness, sensory and motor function intact    No anesthetic complications or complaints noted or reported                 
SONALI FISHER is a 28y  now PPD#1 s/p spontaneous vaginal delivery at 40w5d, complicated by partial Erbs palsy    S:    No acute events overnight.   The patient has no complaints.  Pain controlled with current treatment regimen.   She is ambulating without difficulty and tolerating PO.   + flatus/+ BM/+ voiding   She endorses appropriate lochia, which is decreasing.   She is breastfeeding and bottle feeding.  She denies fevers, chills, nausea and vomiting.   She denies lightheadedness, dizziness, palpitations, chest pain and SOB.     O:    T(C): 36.8 (23 @ 04:43), Max: 37 (23 @ 08:28)  HR: 98 (23 @ 04:43) (87 - 98)  BP: 100/65 (23 @ 04:43) (100/62 - 106/69)  RR: 18 (23 @ 04:43) (16 - 18)  SpO2: 97% (23 @ 04:43) (96% - 98%)    Gen: NAD, AOx3  CV: RRR, S1/S2 present  Pulm: CTAB  Abdomen:  Soft, non-tender, non-distended, +bowel sounds  Uterus:  Fundus firm below umbilicus  VE:  Expected lochia  Ext:  b/l LE non-tender                           9.7    x     )-----------( x        ( 2023 19:19 )             30.7   
SONALI FISHER is a 28y  now PPD#2 s/p spontaneous vaginal delivery at 40w5d, complicated by partial Erbs palsy    S:    Patient has left calf pain  The patient has no complaints.  Pain controlled with current treatment regimen.   She is ambulating without difficulty and tolerating PO.   + flatus/+ BM/+ voiding   She endorses appropriate lochia, which is decreasing.   She is breastfeeding and bottle feeding.  She denies fevers, chills, nausea and vomiting.   She denies lightheadedness, dizziness, palpitations, chest pain and SOB.     O:    Vital Signs Last 24 Hrs  T(C): 37 (2023 03:46), Max: 37 (2023 03:46)  T(F): 98.6 (2023 03:46), Max: 98.6 (2023 03:46)  HR: 99 (2023 03:46) (87 - 99)  BP: 115/74 (2023 03:46) (106/70 - 115/74)  RR: 18 (2023 03:46) (18 - 18)  SpO2: 96% (2023 03:46) (96% - 98%)      Gen: NAD, AOx3, resting comfortably on room air   Abdomen:  Soft, non-tender, non-distended  Uterus:  Fundus firm below umbilicus  VE:  Expected lochia  Ext: Left LE tenderness, not warm to the touch                                      9.7    x     )-----------( x        ( 2023 19:19 )             30.7   
S: Patient doing well. Minimal lochia. No complaints.     O: Vital Signs Last 24 Hrs  T(C): 37 (2023 03:46), Max: 37 (2023 03:46)  T(F): 98.6 (2023 03:46), Max: 98.6 (2023 03:46)  HR: 99 (2023 03:46) (87 - 99)  BP: 115/74 (2023 03:46) (106/70 - 115/74)  BP(mean): --  RR: 18 (2023 03:46) (18 - 18)  SpO2: 96% (2023 03:46) (96% - 98%)    Parameters below as of 2023 03:46  Patient On (Oxygen Delivery Method): room air        Gen: NAD  Breast : breast feeding  Abd: soft, NT, ND, fundus firm below umbilicus.  Voiding well, scant lochia.  Ext: no tenderness    Labs:                        9.7    x     )-----------( x        ( 2023 19:19 )             30.7            PP # 2 s/p     Doing well.  Discharge home today  Nothing in vagina and no heavy lifting for 6 weeks.   Follow up 4 weeks for post partum visit.  Call office for any fevers, chills, heavy vaginal bleeding, symptoms of depression, or any other concerning symptoms.  Continue motrin 600 mg every 6 hours.

## 2023-06-22 NOTE — PROGRESS NOTE ADULT - ASSESSMENT
SONALI FISHER is a 28y  now PPD#2 s/p spontaneous vaginal delivery at 40w5d, complicated by partial Erbs palsy.    -Vital signs stable  -Hgb:12.1>9.7  -Voiding, tolerating PO  -Advance care as tolerated   -Continue routine postpartum care and education  -S/p Nexplanon placement   -Healthy female infant  -Dispo: Plan for discharge home today

## 2024-01-10 NOTE — ED PROVIDER NOTE - PHYSICAL EXAMINATION
Const: Awake, alert and oriented. In no acute distress. Well appearing.  HEENT: NC/AT. Moist mucous membranes.  Eyes: No scleral icterus. EOMI.  Neck:. Soft and supple. Full ROM without pain.  Cardiac: Regular rate and regular rhythm. +S1/S2. Peripheral pulses 2+ and symmetric. No LE edema.  Resp: Speaking in full sentences. No evidence of respiratory distress. No wheezes, rales or rhonchi.  Abd: Soft, non-tender, non-distended. Normal bowel sounds in all 4 quadrants. No guarding or rebound.  : Left lower pelvis tenderness.  Back: Spine midline and non-tender. No CVAT.  Skin: No rashes, abrasions or lacerations.  Lymph: No cervical lymphadenopathy.  Neuro: Awake, alert & oriented x 3. Moves all extremities symmetrically. [id number]

## 2024-01-31 NOTE — OB RN PATIENT PROFILE - ABORTIONS, OB PROFILE
"HEPATOLOGY RETURN PATIENT VISIT    January 24, 2024    Dr. Gretchen Hwang       Patient Name:   ELIZABETH JOHNSTON  Medical Record Number: [id number]  YOB: 1973    Dear Dr. Hwang,    I had the pleasure of seeing Elizabeth Johnston (along with her parents) for follow-up evaluation in the [facility] ([facility] satellite office). History and physical examination was performed. Pertinent available laboratory, imaging, pathology results were reviewed.  I spent over 30 minutes reviewing her chart in preparation for this visit.    Most of history is obtained from the mother because the patient has developmental delay.    History of Present Illness:   The patient is a 50 year old white female who is referred for evaluation of elevated liver function tests and history of hepatic adenoma.    The patient has had elevated LFTs for many years.  She was then referred to me for this problem several years later.    She had been admitted to the hospital in February 2015 with abdominal pain.  Imaging studies suggested a hepatic adenoma with some subcapsular hemorrhage.  There was no evidence of hemodynamic instability.  She was seen by the Chief of Hepatobiliary Surgery.  The recommendation was just to follow this with an intermittent imaging studies.  She never required surgery or ablation.    Even though her LFTs had been elevated for many years, I did not see a complete work-up done in the past other than some basic hepatitis serologies.  In addition, I did not see any imaging studies in many years.    In addition to her liver disease, she does have psychiatric issues and is on multiple psychiatric medications as well as valproic acid and a statin.  She has been on her psychiatric medications for many years.  Her mother describes them as a \"godsend” and reports that they are not sure what they would do if they had to be stopped.    They were under the impression that she did not need to get additional " "imaging studies of the liver.  She has not had an ultrasound since July 2017.  She has been off of birth control pills since 2015.    The patient denied any past history of acute hepatitis or jaundice.      She has never had any manifestations of liver disease including no jaundice, ascites, hepatic encephalopathy, or variceal bleeding. She denied ever having a liver biopsy.    She initially saw me in consultation on 5/24/2023.  At that time, I did additional evaluation (see results below).  After that, she had a FibroScan test that was very technically difficult and not likely reliable and/or accurate.  For that reason, I had her undergo a liver biopsy.  This was done without complications on 8/2/2023.  This did confirm cirrhosis, but did not give an absolute etiology.    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  Otherwise, we could not prove Tin's disease.  She fell into the \"problematic” category of diagnosing her based on the latest guidelines.  For that reason, I started her on elemental zinc at 50 mg once daily on the off chance she could have Tin disease.  We also recommended that she work on JACKSON risk factors.    She presented today for follow-up evaluation.  She denied any specific liver-related complaints.  She is tolerating the zinc well.    Past Medical/Surgical History:   ? Hyperlipidemia (272.4) (E78.5)  ? Added by Problem List Migration; 2013-6-4; Moved to Suppressed Nov 27 2013 9:11PM  ? Hepatic adenoma (211.5) (D13.4)  ? Hypothyroidism (244.9) (E03.9)  ? Elevated platelet count (790.6) (R79.89)  ? Mild vitamin D deficiency (268.9) (E55.9)  ? 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 Tremor (781.0) (R25.1)  ? Overweight with body mass index (BMI) of 27 to 27.9 in adult (278.02,V85.23)  (E66.3,Z68.27)  ? Mood swings (799.24) (R45.86)  ? Benign neoplasm of liver (211.5) (D13.4)  ? Colon cancer screening (V76.51) (Z12.11)  ? Elevated platelet count (790.6) " (R79.89)  ? Encounter for immunization (V03.89) (Z23)  ? Hepatic adenoma (211.5) (D13.4)  ? Hyperlipidemia (272.4) (E78.5)  ? Added by Problem List Migration; 2013-6-4; Moved to Suppressed Nov 27 2013 9:11PM  ? Hypothyroidism (244.9) (E03.9)  ? Lymphedema (457.1) (I89.0)  ? Medication-induced postural tremor (333.1,E980.5) (G25.1)  ? Mild vitamin D deficiency (268.9) (E55.9)  ? Mood disorder (296.90) (F39)  ? Mood swings (799.24) (R45.86)  ? Skin lesion (709.9) (L98.9)  ? Tremor (781.0) (R25.1)  ? 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 History of abnormal mammogram (V15.89) (Z87.898)  ? History of edema (V13.89) (Z87.898)  ? Resolved Date: 31 Jan 2023  ? Added by Problem List Migration; 2013-8-27; Moved to Suppressed Nov 27 2013 9:11PM  COVID    Family History:   Mother  ? Family history of diabetes mellitus (V18.0) (Z83.3)  ? Family history of hyperlipidemia (V18.19) (Z83.438)  ? Family history of hypertension (V17.49) (Z82.49)  Father  ? Family history of hyperlipidemia (V18.19) (Z83.438)  There is no known family history of colon cancer.  There is no known family history of liver disease.    Social History:   She denied tobacco use.  She denied alcohol use.  She denied tattoos.  She denied intravenous drug use.  She denied blood transfusions.  She is attending a daily adult  center and is quite happy and is making friends.    Review of systems: As noted above. No fever, chills, weight loss, visual changes, auditory changes, shortness of breath, chest pain, abdominal pain, GI bleeding, diarrhea, constipation, depression, dysuria, hematuria, musculoskeletal issues, or rash.       Medical, Surgical, Family, and Social Histories  No past medical history on file.    Past Surgical History:   Procedure Laterality Date    US GUIDED NEEDLE LIVER BIOPSY  8/2/2023    US GUIDED NEEDLE LIVER BIOPSY 8/2/2023 [facility] US       No family history on file.    Social History     Socioeconomic History    Marital status: Single      Spouse name: Not on file    Number of children: Not on file    Years of education: Not on file    Highest education level: Not on file   Occupational History    Not on file   Tobacco Use    Smoking status: Never     Passive exposure: Never    Smokeless tobacco: Never   Substance and Sexual Activity    Alcohol use: Not on file    Drug use: Not on file    Sexual activity: Not on file   Other Topics Concern    Not on file   Social History Narrative    Not on file     Social Determinants of Health     Financial Resource Strain: Not on file   Food Insecurity: Not on file   Transportation Needs: Not on file   Physical Activity: Not on file   Stress: Not on file   Social Connections: Not on file   Intimate Partner Violence: Not on file   Housing Stability: Not on file       Allergies and Current Medications  No Known Allergies  Current Outpatient Medications   Medication Sig Dispense Refill    ARIPiprazole (Abilify) 15 mg tablet Take 1 tablet (15 mg) by mouth once daily.      cholecalciferol (Vitamin D-3) 50 mcg (2,000 unit) capsule Take by mouth.      divalproex (Depakote ER) 250 mg 24 hr tablet Take 1 tablet (250 mg) by mouth once daily. At night      fenofibrate micronized (Lofibra) 134 mg capsule TAKE 1 CAPSULE BY MOUTH EVERY DAY 30 capsule 0    FLUoxetine (PROzac) 60 mg tablet Take by mouth.      levothyroxine (Synthroid, Levoxyl) 25 mcg tablet TAKE 1 TABLET BY MOUTH EVERY DAY 30 tablet 0    lovastatin (Mevacor) 20 mg tablet TAKE 1 TABLET BY MOUTH EVERY DAY 90 tablet 1    multivitamin (One Daily Multivitamin) tablet Take by mouth.      zinc gluconate 50 mg tablet Take 1 tablet (50 mg of elemental zinc) by mouth 2 times a day. 60 tablet 11    brompheniramine-pseudoeph-DM 2-30-10 mg/5 mL syrup Take 10 mL by mouth every 6 hours if needed.      cholecalciferol (Vitamin D-3) 5,000 Units tablet Take 1 tablet (5,000 Units) by mouth once daily.      cholecalciferol, vitamin D3, 25 mcg (1,000 unit) tablet,chewable Chew.       "divalproex (Depakote) 500 mg EC tablet Take 1 tablet (500 mg) by mouth.      FLUoxetine (PROzac) 20 mg capsule Take 3 capsules (60 mg) by mouth once daily.      fluticasone (Flonase) 50 mcg/actuation nasal spray 1 spray by Does not apply route twice a day.      ondansetron (Zofran) 4 mg tablet Take 1 tablet (4 mg) by mouth every 8 hours if needed.       No current facility-administered medications for this visit.        Physical Exam  /80   Pulse 96   Temp 36.3 °C (97.4 °F)   Ht 1.422 m (4' 8\")   Wt 54.9 kg (121 lb)   BMI 27.13 kg/m²       Physical Examination:   Deferred as we spent over 30 minutes discussing results and plans.    Labs 1/17/2024 INR 1.1, AFP < 4, WBC 8.8, hemoglobin 13.3, platelets 333, protein 7.9, albumin 4.6, alk phos 60, bilirubin 0.6, , ALT 63, glucose 95, sodium 141, creatinine 0.69.    Labs 12/16/2023 COVID PCR positive.    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    Labs 9/7/2023 ceruloplasmin 31.    Labs 7/26/2023 WBC 8.1, hemoglobin 13.4, platelets 342, glucose 101, sodium 143, creatinine 0.72, protein 7.7, albumin 4.5, alk phos 50, bilirubin 0.7, , ALT 74, INR 1.1.    Labs 6/8/2023 AFP < 4, HAV +, HBsAg -, HBsAb -, HCV -, ferritin 108, iron sat 15%, PAKO -, AMA -, SMA 40, A1AT phenotype MM.    Labs 2/3/2023 TSH 2.08, vitamin D was 26, glucose 77, sodium 142, creatinine 0.68, protein 7.8, albumin 4.8, alk phos 46, bilirubin 0.6, , ALT 82, cholesterol 192, , HDL 59, triglycerides 133, WBC 8.5, hemoglobin 13.6, platelets 390.    Labs 3/9/2022 AST 54, ALT 40.    Labs 1/26/2021 AST 70, ALT 61.    Labs 7/20/2018 alk phos 31, AST 40, ALT 37.    Labs 3/14/2015 ALT 78.    Labs 2/27/2015 ferritin 201, iron saturation 6%, hepatitis B surface antigen negative, hepatitis C antibody negative, AFP < 1.    Labs 2/25/2015 .    Labs 1/19/2015 ALT 44.    Labs 1/18/2007 alk phos 39, bilirubin 2.1, AST " 43, ALT 28.    Ultrasound 1/10/2024:  IMPRESSION:  Mild hepatomegaly.      Redemonstration of scattered small hypoechoic lesions, nonspecific,  however statistically likely representing hemangiomas. Hepatic mass  protocol MRI may be obtained for further assessment.      US 6/7/2023:  IMPRESSION:  Hepatomegaly.  Multiple small echogenic liver lesions favored to represent  hemangiomas or adenomas but incompletely characterized sonographically    Ultrasound 7/6/2017:  IMPRESSION:  Multiple scattered nonshadowing echogenic liver lesions as described.  The largest lesion is slightly larger today. Otherwise, these are  grossly stable.  Remainder of the exam was negative.    MRI 2/19/2016:  IMPRESSION:  1. Interval decrease in size of segment 7 lesion now measuring 1.9 x  2.1 x 2.3 cm versus 2.1 x 2.4 x 2.3 cm on prior exam, likely adenoma  complicated by prior hemorrhage.  2. Stable segment 7 probable hemangioma.  3. Stable redemonstration of hepatic segment 4A and 6 sub cm lesions  which, while evaluation is limited due to motion degradation of early  postcontrast images, may represent slow filling hemangiomas or  adenomas.  4. No evidence of new hepatic lesions.    CAT scan with contrast 1/19/2015:  Impression:  1. Limited examination due to motion artifact.  2. No obvious acute abdominal or pelvic process.  3. Heterogeneous mass in the right lobe of the liver measuring 4.1 x  4.9 cm. This could represent a hepatic adenoma or FNH, however,  malignant hepatic masses are not excluded. Further evaluation with  liver MRI is recommended. This may be performed on a nonemergent  basis.  4. Hypodensity in the pancreatic body likely represents prominent fat  between pancreatic lobules. However, the appearance is somewhat  unusual due to motion artifact. Attention to this area on followup is  recommended.    Liver biopsy 8/2/2023:  FINAL DIAGNOSIS  A.  RANDOM LIVER BIOPSY:    -- LIVER PARENCHYMA WITH CIRRHOSIS    Note: The liver  tissue is nodular with bridging and circumferential fibrosis.  The portal tracts are mildly expanded with mostly lymphocytes.  No significant  bile duct damage or interface hepatitis is seen.  The hepatocytes demonstrate  Mirella Denk bodies and feathery degeneration with rare acidophil bodies.  No  cholestasis is seen.      Special stains:  Iron               negative  PAS with digestion     no cytoplasmic eosinophilic globules  Trichrome          bridging fibrosis and circumferential fibrosis  Reticulin          no collapsed liver parenchyma  Copper               negative       FibroScan 7/12/2023 revealed a median liver stiffness of 33.7 kPa with IQR 33% and .        Assessment/Plan:     Elevated liver function tests  History of hepatic adenoma  Cirrhosis    The patient is referred to me for follow-up evaluation of the above issues.    As noted above, the patient has had elevated LFTs for many years.  Other than imaging studies and hepatitis serologies, I see no particular work-up having been done for this in the past.  For that reason, I did a full serologic work-up.  Her serologic work-up for other causes of liver disease was essentially unrevealing.    In addition, she is on medications which can affect the liver such as valproic acid, Abilify, and Prozac.  Depending on our work-up, her other providers may need to decide whether some of her medicine should be stopped or switched to see if this helps the LFTs.  Unfortunately, her mother admits that it may be very difficult to consider stopping any of her psychiatric medications.    With her history of psychiatric issues and liver disease and a low alkaline phosphatase, we would need to consider Tin's disease in our differential.  Her ceruloplasmin was normal.  She was not able to cooperate with a 24-hour urine copper test.  She did see her eye doctor on 5/24/2023 and her mother told us that they saw no evidence of Wen Fleischer rings.  The genetic  test showed one abnormality but was not diagnostic for Tin's disease.    Given the unreliable FibroScan as well as concern for Tin's disease, we had her undergo a liver biopsy.  This was done in August 2023 and confirmed cirrhosis.  Unfortunately, they could not determine an etiology.  Of note, the copper stain was negative.    Again, even though Tin disease seems somewhat unlikely, I cannot completely rule it out.  For that reason, I am comfortable just leaving her on the elemental zinc.  She is tolerating the low-dose of 50 mg daily.  I would just keep her on that dose.    She is immune to hepatitis A.    She is not immune to hepatitis B.  I again recommended that they check with her insurance company about arranging these vaccinations.    With a history of an adenoma, these can enlarge and can even undergo malignant transformation.  We had her undergo an ultrasound that showed that these were relatively stable.  She should remain off of her birth control pills.  Now that we know that she has cirrhosis, she should get AFP and ultrasound every 6 months anyway.    With her cirrhosis, and given the high liver stiffness on FibroScan, she should undergo EGD.  This will need to be done with anesthesia assistance.      Her parents do not feel that she would be able to do a colonoscopy preparation.  She has actually had a Cologuard in the past that was reportedly negative and they will pass on attempting colonoscopy at this time.      She can get labs every 3 months, AFP and ultrasound every 6 months, and see me back in about 7 months.    Thank you for allowing me to participate in the care of this patient. Please feel free to contact me with any questions regarding their care.     Sincerely,     Tyrese Vann MD, FAASLD, FACG.   Medical Director, Hepatology  Senior Attending Physician  [facility]  [facility]  Professor of Medicine  Division of  Gastroenterology and Liver Disease  [address] [address]  Phone: [phone]  Fax: [phone].      This document was generated with a computerized dictation system.  Because of this, there could be errors in grammar and/or content.         0

## 2024-02-28 NOTE — OB PROVIDER H&P - ALERT: PERTINENT HISTORY
Detail Level: Zone Detail Level: Detailed 20 Week Level II Sonogram/BioPhysical Profile(s)/Fetal Non-Stress Test (NST)

## 2024-11-05 NOTE — OB RN PATIENT PROFILE - FUNCTIONAL ASSESSMENT - BASIC MOBILITY SCORE HIDDEN
Problem: Discharge Planning  Goal: Discharge to home or other facility with appropriate resources  Outcome: Progressing  Flowsheets (Taken 1/17/2024 1155)  Discharge to home or other facility with appropriate resources:   Identify barriers to discharge with patient and caregiver   Arrange for needed discharge resources and transportation as appropriate   Identify discharge learning needs (meds, wound care, etc)   Refer to discharge planning if patient needs post-hospital services based on physician order or complex needs related to functional status, cognitive ability or social support system     Problem: ABCDS Injury Assessment  Goal: Absence of physical injury  Outcome: Progressing     Problem: Pain  Goal: Verbalizes/displays adequate comfort level or baseline comfort level  Outcome: Progressing      Monthly or less 24

## 2025-03-26 NOTE — OB RN DELIVERY SUMMARY - BABY A: WEIGHT IN POUNDS (FROM GRAMS), DELIVERY
Well-Child Checkup: 6 to 10 Years  Even if your child is healthy, keep bringing them in for yearly checkups. These visits make sure that your child’s health is protected with scheduled vaccines and health screenings. Your child's healthcare provider will also check their growth and development. This sheet describes some of what you can expect.   School, social, and emotional issues      Struggles in school can indicate problems with a child’s health or development. If your child is having trouble in school, talk to the child’s healthcare provider.     Here are some topics you, your child, and the healthcare provider may want to discuss during this visit:   Reading. Does your child like to read? Is the child reading at the right level for their age group?   Friendships. Does your child have friends at school? How do they get along? Do you like your child’s friends? Do you have any concerns about your child’s friendships or problems that may be happening with other children, such as bullying?  Activities. What does your child like to do for fun? Are they involved in after-school activities, such as sports, scouting, or music classes?   Family interaction. How are things at home? Does your child have good relationships with others in the family? Do they talk to you about problems? How is the child’s behavior at home?   Behavior and participation at school. How does your child act at school? Does the child follow the classroom routine and take part in group activities? What do teachers say about the child’s behavior? Is homework finished on time? Do you or other family members help with homework?  Household chores. Does your child help around the house with chores, such as taking out the trash or setting the table?  Puberty. Your child will become more aware of their body as they approach puberty. Body image and eating disorders sometimes start at this age.  Emotional health. Experts advise screening children ages 8  to 18 for anxiety. Talk with your child's healthcare provider if you have any concerns about how they are coping.  Nutrition and exercise tips  Teaching your child healthy eating and lifestyle habits can lead to a lifetime of good health. To help, set a good example with your words and actions. Remember, good habits formed now will stay with your child forever. Here are some tips:   Help your child get at least 60 minutes of active play per day. Moving around helps keep your child healthy. Go to the park, ride bikes, or play active games like tag or ball.  Limit screen time to 1 hour each day. This includes time spent watching TV, playing video games, using the computer, and texting. If your child has a TV, computer, or video game console in the bedroom, replace it with a music player. For many kids, dancing and singing are fun ways to get moving.  Limit sugary drinks. Soda, juice, and sports drinks lead to unhealthy weight gain and tooth decay. Water and low-fat or nonfat milk are best to drink. In moderation (6 ounces for a child 6 years old and 8 ounces for a child 7 to 10 years old daily), 100% fruit juice is OK. Save soda and other sugary drinks for special occasions.   Serve nutritious foods. Keep a variety of healthy foods on hand for snacks, including fresh fruits and vegetables, lean meats, and whole grains. Foods like french fries, candy, and snack foods should only be served rarely.   Serve child-sized portions. Children don’t need as much food as adults. Serve your child portions that make sense for their age and size. Let your child stop eating when they are full. If your child is still hungry after a meal, offer more vegetables or fruit.  Ask the healthcare provider about your child’s weight. Your child should gain about 4 to 5 pounds each year. If your child is gaining more than that, talk to the healthcare provider about healthy eating habits and exercise guidelines.  Bring your child to the dentist  at least twice a year for teeth cleaning and a checkup.  Sleeping tips  Now that your child is in school, a good night’s sleep is even more important. At this age, your child needs about 10 hours of sleep each night. Here are some tips:   Set a bedtime and make sure your child follows it each night.  TV, computer, and video games can agitate a child and make it hard to calm down for the night. Turn them off at least an hour before bed. Instead, read a chapter of a book together.  Remind your child to brush and floss their teeth before bed. Directly supervise your child's dental self-care to make sure that both the back teeth and the front teeth are cleaned.  Safety tips  Recommendations to keep your child safe include the following:   When riding a bike, your child should wear a helmet with the strap fastened. While roller-skating, roller-blading, or using a scooter or skateboard, it’s safest to wear wrist guards, elbow pads, knee pads, and a helmet.  In the car, continue to use a booster seat until your child is taller than 4 feet 9 inches. At this height, kids are able to sit with the seat belt fitting correctly over the collarbone and hips. Ask the healthcare provider if you have questions about when your child will be ready to stop using a booster seat. All children younger than 13 should sit in the back seat.  Teach your child not to talk to strangers or go anywhere with a stranger.  Teach your child to swim. Many communities offer low-cost swimming lessons. Do not let your child play in or around a pool unattended, even if they know how to swim.  Teach your child to never touch guns. If you own a gun, always remember to store it unloaded in a locked location. Lock the ammunition in a separate location.  Vaccines  Based on recommendations from the CDC, at this visit your child may receive the following vaccines:   Diphtheria, tetanus, and pertussis (age 6 only)  Human papillomavirus (HPV) (ages 9 and  up)  Influenza (flu), annually  Measles, mumps, and rubella (age 6)  Polio (age 6)  Varicella (chickenpox) (age 6)  COVID-19  Bedwetting: It’s not your child’s fault  Bedwetting, or urinating when sleeping, can be frustrating for both you and your child. But it’s usually not a sign of a major problem. Your child’s body may simply need more time to mature. If a child suddenly starts wetting the bed, the cause is often a lifestyle change (such as starting school) or a stressful event (such as the birth of a sibling). But whatever the cause, it’s not in your child’s direct control. If your child wets the bed:   Keep in mind that your child is not wetting on purpose. Never punish or tease a child for wetting the bed. Punishment or shaming may make the problem worse, not better.  To help your child, be positive and supportive. Praise your child for not wetting and even for trying hard to stay dry.  Two hours before bedtime don’t serve your child anything to drink.  Remind your child to use the toilet before bed. You could also wake them to use the bathroom before you go to bed yourself.  Have a routine for changing sheets and pajamas when the child wets. Try to make this routine as calm and orderly as possible. This will help keep both you and your child from getting too upset or frustrated to go back to sleep.  Put up a calendar or chart and give your child a star or sticker for nights that they don’t wet the bed.  Encourage your child to get out of bed and try to use the toilet if they wake during the night. Put night-lights in the bedroom, hallway, and bathroom to help your child feel safer walking to the bathroom.  If you have concerns about bedwetting, discuss them with the healthcare provider.  [employer] last reviewed this educational content on 10/1/2022  © 2645-6400 [employer]. All rights reserved. This information is not intended as a substitute for professional medical care. Always follow your  healthcare professional's instructions.         7

## 2025-04-29 NOTE — OB RN PATIENT PROFILE - FUNCTIONAL ASSESSMENT - BASIC MOBILITY 4.
Pt to ER c/o urine leakage with sediment onset Sunday. Had increased leakage yesterday and had large gush of urine last night. Today + hematuria. Denies foul odor or burning with urination. Denies pain.    4 = No assist / stand by assistance